# Patient Record
Sex: MALE | Race: WHITE | Employment: FULL TIME | URBAN - METROPOLITAN AREA
[De-identification: names, ages, dates, MRNs, and addresses within clinical notes are randomized per-mention and may not be internally consistent; named-entity substitution may affect disease eponyms.]

---

## 2017-04-26 ENCOUNTER — HOSPITAL ENCOUNTER (OUTPATIENT)
Dept: NON INVASIVE DIAGNOSTICS | Age: 57
Discharge: HOME | End: 2017-04-26
Attending: FAMILY MEDICINE | Admitting: FAMILY MEDICINE

## 2017-04-26 DIAGNOSIS — R00.0 TACHYCARDIA: ICD-10-CM

## 2019-06-24 ENCOUNTER — HOSPITAL ENCOUNTER (OUTPATIENT)
Dept: GENERAL RADIOLOGY | Age: 59
Discharge: HOME | End: 2019-06-24
Payer: COMMERCIAL

## 2019-06-24 ENCOUNTER — HOSPITAL ENCOUNTER (OUTPATIENT)
Age: 59
Discharge: HOME | End: 2019-06-24
Payer: COMMERCIAL

## 2019-06-24 DIAGNOSIS — R52 ACUTE PAIN: ICD-10-CM

## 2019-06-24 PROCEDURE — 73030 X-RAY EXAM OF SHOULDER: CPT

## 2021-09-23 ENCOUNTER — HOSPITAL ENCOUNTER (OUTPATIENT)
Age: 61
Discharge: HOME | End: 2021-09-23
Payer: COMMERCIAL

## 2021-09-23 ENCOUNTER — HOSPITAL ENCOUNTER (OUTPATIENT)
Dept: GENERAL RADIOLOGY | Age: 61
Discharge: HOME | End: 2021-09-23
Payer: COMMERCIAL

## 2021-09-23 DIAGNOSIS — I10 ESSENTIAL HYPERTENSION, MALIGNANT: ICD-10-CM

## 2021-09-23 PROCEDURE — 71046 X-RAY EXAM CHEST 2 VIEWS: CPT

## 2022-07-11 ENCOUNTER — HOSPITAL ENCOUNTER (EMERGENCY)
Age: 62
Discharge: HOME | End: 2022-07-11
Payer: COMMERCIAL

## 2022-07-11 VITALS
RESPIRATION RATE: 18 BRPM | DIASTOLIC BLOOD PRESSURE: 88 MMHG | TEMPERATURE: 99.2 F | HEART RATE: 64 BPM | OXYGEN SATURATION: 95 % | SYSTOLIC BLOOD PRESSURE: 133 MMHG

## 2022-07-11 DIAGNOSIS — S81.811A LACERATION OF RIGHT LOWER LEG, INITIAL ENCOUNTER: Primary | ICD-10-CM

## 2022-07-11 PROCEDURE — 99282 EMERGENCY DEPT VISIT SF MDM: CPT

## 2022-07-11 PROCEDURE — 12002 RPR S/N/AX/GEN/TRNK2.6-7.5CM: CPT

## 2022-07-11 ASSESSMENT — ENCOUNTER SYMPTOMS
VOMITING: 0
COUGH: 0
NAUSEA: 0
COLOR CHANGE: 0
BACK PAIN: 0
ABDOMINAL PAIN: 0
SHORTNESS OF BREATH: 0
DIARRHEA: 0
WHEEZING: 0
SORE THROAT: 0

## 2022-07-11 ASSESSMENT — PAIN SCALES - GENERAL: PAINLEVEL_OUTOF10: 4

## 2022-07-11 ASSESSMENT — PAIN - FUNCTIONAL ASSESSMENT: PAIN_FUNCTIONAL_ASSESSMENT: 0-10

## 2022-07-11 ASSESSMENT — PAIN DESCRIPTION - LOCATION: LOCATION: LEG

## 2022-07-11 ASSESSMENT — PAIN DESCRIPTION - ORIENTATION: ORIENTATION: RIGHT

## 2022-07-11 NOTE — ED PROVIDER NOTES
**ADVANCED PRACTICE PROVIDER, I HAVE EVALUATED THIS [facility] ([place], [place])  ED  EMERGENCY DEPARTMENT ENCOUNTER      Pt Name: Olena Jerome  DRN:[id number]  [place] 1960  Date of evaluation: 7/11/2022  Provider: NISHA Mayfield CNP      Chief Complaint:    Chief Complaint   Patient presents with    Laceration     right lower leg lac off a piece of glass that hit the top of his leg         Nursing Notes, Past Medical Hx, Past Surgical Hx, Social Hx, Allergies, and Family Hx were all reviewed and agreed with or any disagreements were addressed in the HPI.    HPI: (Location, Duration, Timing, Severity, Quality, Assoc Sx, Context, Modifying factors)    Chief Complaint of right anterior leg laceration    This is a  62 y.o. 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 Injury occurred just prior to ED arrival.  Rates the discomfort a 4 out of 10. Denies any numbness numbing or paresthesias. No additional injuries or complaints. No additional aggravating or alleviating factors. Patient presents awake, alert and in no acute respiratory distress or toxic appearance.     PastMedical/Surgical History:      Diagnosis Date    Calcium oxalate renal stones     Dislocated shoulder     Hyperlipidemia     Hypertension          Procedure Laterality Date    LITHOTRIPSY      SPINAL FUSION         Medications:  Discharge Medication List as of 7/11/2022  1:31 PM      CONTINUE these medications which have NOT CHANGED    Details   Zolpidem Tartrate (AMBIEN PO) Take by mouth nightly      NONFORMULARY bp pill and cholesterol pill      naproxen (NAPROSYN) 500 MG tablet Take 1 tablet by mouth 2 times daily (with meals), Disp-30 tablet, R-0               Review of Systems:  (2-9 systems needed)  Review of Systems   Constitutional: Negative for chills and fever. HENT: Negative for congestion and sore throat. Respiratory: Negative for cough, shortness of breath and wheezing. Cardiovascular: Negative for chest pain. Gastrointestinal: Negative for abdominal pain, diarrhea, nausea and vomiting. Musculoskeletal: Negative for back pain. Skin: Positive for wound. Negative for color change. 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 Neurological: Negative for weakness, numbness and headaches. \"Positives and Pertinent negatives as per HPI\"    Physical Exam:  Physical Exam  Vitals and nursing note reviewed. Constitutional:       Appearance: He is well-developed. He is not diaphoretic. HENT:      Head: Normocephalic. Right Ear: External ear normal.      Left Ear: External ear normal.   Eyes:      General: No scleral icterus. Right eye: No discharge. Left eye: No discharge. Cardiovascular:      Rate and Rhythm: Normal rate. Pulmonary:      Effort: Pulmonary effort is normal. No respiratory distress. Musculoskeletal:         General: Normal range of motion. Cervical back: Normal range of motion and neck supple. Skin:     General: Skin is warm. Capillary Refill: Capillary refill takes less than 2 seconds. Coloration: Skin is not pale. Comments: Patient presents with a 5 cm laceration across the middle aspect of his right shin, it is actively bleeding upon ED arrival, I immediately anesthetized the area for bleeding control and wound care. There is no bony involvement, compartments of the leg are soft, no visible foreign body. He has full active range of motion of the right leg. Cap refill less than 3 seconds. Peripheral pulses 2+     Neurological:      General: No focal deficit present. Mental Status: He is alert and oriented to person, place, and time. GCS: GCS eye subscore is 4. GCS verbal subscore is 5. GCS motor subscore is 6. Psychiatric:         Behavior: Behavior normal.         MEDICAL DECISION MAKING    Vitals:    Vitals:    07/11/22 1115   BP: 133/88   Pulse: 64   Resp: 18   Temp: 99.2 °F (37.3 °C)   TempSrc: Oral   SpO2: 95%       LABS:Labs Reviewed - No data to display     Remainder of labs reviewed and were negative at this time or not returned at the time of this note. RADIOLOGY:   Non-plain film images such as CT, Ultrasound and MRI are read by the radiologist. Bee AHN APRN - CNP have directly visualized the radiologic plain film image(s) with the below findings:      Interpretation per the Radiologist below, if available at the time of this note:    No orders to display        No results found. MEDICAL DECISION MAKING / ED COURSE:      PROCEDURES:   Procedures    Laceration Repair Procedure Note    Indication: Laceration    Procedure: The patient was placed in the appropriate position and anesthesia around the laceration was obtained by infiltration using 1% Lidocaine with epinephrine. The area was then cleansed with Shur-Clens and draped in a sterile fashion and irrigated with Shur-Clens and normal saline. The laceration was closed with 4-0 Ethilon using interrupted sutures. There were no additional lacerations requiring repair. The wound area was then dressed with bacitracin, gauze and tape. Total repaired wound length: 5 cm. Other Items: Suture count: 14 (2 mattress and 12 simple interrupted)     The patient tolerated the procedure well.     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 Injury occurred just prior to ED arrival.  Rates the discomfort a 4 out of 10. Denies any numbness numbing or paresthesias. After evaluation and examination the patient diabetes anesthetized the area, wound care was completed by myself along with suture repair, I did educate the patient that the sutures to stay in for the next 14 days, the glass that he was using did not break, I do not feel sisters do any x-rays, I did however do the suture procedure, see procedure note and he tolerated it well. Patient was educated about wound care, avoid excessive exposure to water as this will delay wound healing. However, I estimate there is LOW risk for COMPARTMENT SYNDROME, TENDON OR NEUROVASCULAR INJURY, FOREIGN BODY OR signs of INFECTION thus I consider the discharge disposition reasonable. Therefore, shared medical decision was made between the patient and myself and we agreed that the patient could be discharged home with outpatient follow-up. Patient was discharged home with referral back to PCP, educated about wound care, take Tylenol Motrin for any discomfort. Keep the wound clean and dry, avoid excessive exposure to water as this will delay wound healing. Have sutures removed in 14 days, he can take them out at his PCP office or return here. Monitor for signs symptoms of infection. The patient tolerated their visit well. I evaluated the patient. The physician was available for consultation as needed. The patient and / or the family were informed of the results of any tests, a time was given to answer questions, a plan was proposed and they agreed with plan. Patient verbalized understanding of discharge instructions and was discharged from the department in stable condition. CLINICAL IMPRESSION:  1.  Laceration of right lower leg, initial encounter        DISPOSITION Decision To Discharge 07/11/2022 01:22:18 PM      PATIENT REFERRED TO:  Hubert Oliveros DO  [address] [place] [id number] [phone]      However wound recheck in 2 to 3 days and have sutures removed in 14 days    [facility]  ED  [address]  Go to   If symptoms worsen      DISCHARGE MEDICATIONS:  Discharge Medication List as of 7/11/2022  1:31 PM          DISCONTINUED MEDICATIONS:  Discharge Medication List as of 7/11/2022  1:31 PM                 (Please note the MDM and HPI sections of this note were completed with a voice recognition program.  Efforts were made to edit the dictations but occasionally words are mis-transcribed.)    Electronically signed, NISHA Sousa CNP,           NISHA Sousa CNP  07/11/22 4106

## 2022-07-11 NOTE — ED NOTES
Performed wound care. Cleaned laceration with normal saline and hibiclens. Irrigated laceration with ~500 mL of normal saline. Sterile technique used for cleaning. Pt tolerated well.       Ji Luciano  07/11/22 5708

## 2023-01-05 ENCOUNTER — HOSPITAL ENCOUNTER (OUTPATIENT)
Dept: CT IMAGING | Age: 63
Discharge: HOME | End: 2023-01-05
Payer: COMMERCIAL

## 2023-01-05 DIAGNOSIS — R10.12 LEFT UPPER QUADRANT ABDOMINAL PAIN: ICD-10-CM

## 2023-01-05 PROCEDURE — 74176 CT ABD & PELVIS W/O CONTRAST: CPT

## 2024-05-09 ENCOUNTER — HOSPITAL ENCOUNTER (EMERGENCY)
Age: 64
Discharge: HOME | End: 2024-05-10
Attending: EMERGENCY MEDICINE
Payer: COMMERCIAL

## 2024-05-09 DIAGNOSIS — J38.5 LARYNGOSPASM: Primary | ICD-10-CM

## 2024-05-09 PROCEDURE — 99283 EMERGENCY DEPT VISIT LOW MDM: CPT

## 2024-05-09 RX ORDER — ATORVASTATIN CALCIUM 10 MG/1
10 TABLET, FILM COATED ORAL DAILY
COMMUNITY

## 2024-05-09 ASSESSMENT — LIFESTYLE VARIABLES
HOW MANY STANDARD DRINKS CONTAINING ALCOHOL DO YOU HAVE ON A TYPICAL DAY: PATIENT DOES NOT DRINK
HOW OFTEN DO YOU HAVE A DRINK CONTAINING ALCOHOL: NEVER

## 2024-05-10 VITALS
SYSTOLIC BLOOD PRESSURE: 147 MMHG | OXYGEN SATURATION: 98 % | HEART RATE: 68 BPM | TEMPERATURE: 98.5 F | DIASTOLIC BLOOD PRESSURE: 80 MMHG | RESPIRATION RATE: 15 BRPM

## 2024-05-10 NOTE — ED NOTES
Pt feeling much better after drinking water.  States \" ready to go\".  RN updated MD.  Awaiting orders.  Garry PAINTER

## 2024-05-10 NOTE — ED PROVIDER NOTES
[facility] ED  EMERGENCY DEPARTMENT ENCOUNTER      Pt Name: Dain Gloria  MRN: [id number]  Birthdate 1960  Date of evaluation: 5/9/2024  Provider: Nevin Odonnell MD    CHIEF COMPLAINT       Chief Complaint   Patient presents with    Inhalation     Patient inhaled insulation while at work, complains of throat irritation and difficulty breathing         HISTORY OF PRESENT ILLNESS   (Location/Symptom, Timing/Onset, Context/Setting, Quality, Duration, Modifying Factors, Severity)  Note limiting factors.   Dain Gloria is a 63 y.o. male who presents to the emergency department for difficulty breathing after inhalation of insulation.  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  He states that he was having difficulty breathing and coughing with stridor..  He asked his work to call 911 but before they got there he states that he did begin to feel better.  He states he still felt like there was something in his throat and so he wanted to come in \"just to be safe\".  He states that he has no prior history of lung problems.  No nausea or vomiting.  No chest pain.  No current shortness of breath.  No difficulty swallowing.      This patient is at risk for a communicable infection. Therefore, personal protection equipment consisting of a mask and gloves worn for the exam.     Nursing Notes were reviewed.    REVIEW OF SYSTEMS    (2-9 systems for level 4, 10 or more for level 5)     As per HPI    Except as noted above the remainder of the review of systems was reviewed and negative.       PAST MEDICAL HISTORY     Past Medical History:   Diagnosis Date    Calcium oxalate renal stones     Dislocated shoulder     Hyperlipidemia     Hypertension          SURGICAL HISTORY       Past Surgical History:   Procedure  difficulty breathing, new or concerning symptom [AM]      ED Course User Index  [AM] Nevin Odonnell MD         CRITICAL CARE TIME   None    CONSULTS:  None    PROCEDURES:  Unless otherwise noted below, none     Procedures        FINAL IMPRESSION      1. Laryngospasm          DISPOSITION/PLAN   DISPOSITION Decision To Discharge 05/09/2024 11:23:23 PM      PATIENT REFERRED TO:  Binh Sebastian DO  [address] [facility] [address]  [facility] [address]  [phone]    Schedule an appointment as soon as possible for a visit in 3 days        DISCHARGE MEDICATIONS:  New Prescriptions    No medications on file     Controlled Substances Monitoring:          No data to display                (Please note that portions of this note were completed with a voice recognition program.  Efforts were made to edit the dictations but occasionally words are mis-transcribed.)    Nevin Odonnell MD (electronically signed)  Attending Emergency Physician           Nevin Odonnell MD  05/09/24 1743

## 2024-10-29 ENCOUNTER — HOSPITAL ENCOUNTER (OUTPATIENT)
Dept: GENERAL RADIOLOGY | Age: 64
Discharge: HOME | End: 2024-10-29
Payer: COMMERCIAL

## 2024-10-29 ENCOUNTER — HOSPITAL ENCOUNTER (OUTPATIENT)
Age: 64
Discharge: HOME | End: 2024-10-29
Payer: COMMERCIAL

## 2024-10-29 DIAGNOSIS — M25.562 ACUTE PAIN OF LEFT KNEE: ICD-10-CM

## 2024-10-29 PROCEDURE — 73562 X-RAY EXAM OF KNEE 3: CPT
